# Patient Record
Sex: MALE | Race: WHITE | NOT HISPANIC OR LATINO | Employment: OTHER | URBAN - METROPOLITAN AREA
[De-identification: names, ages, dates, MRNs, and addresses within clinical notes are randomized per-mention and may not be internally consistent; named-entity substitution may affect disease eponyms.]

---

## 2017-05-24 ENCOUNTER — NEW REFERRAL (OUTPATIENT)
Dept: URBAN - METROPOLITAN AREA CLINIC 26 | Facility: CLINIC | Age: 67
End: 2017-05-24

## 2017-05-24 VITALS
HEIGHT: 68 IN | DIASTOLIC BLOOD PRESSURE: 66 MMHG | BODY MASS INDEX: 22.73 KG/M2 | HEART RATE: 71 BPM | SYSTOLIC BLOOD PRESSURE: 103 MMHG | WEIGHT: 150 LBS

## 2017-05-24 DIAGNOSIS — H57.11: ICD-10-CM

## 2017-05-24 DIAGNOSIS — B20: ICD-10-CM

## 2017-05-24 DIAGNOSIS — H02.836: ICD-10-CM

## 2017-05-24 DIAGNOSIS — H02.833: ICD-10-CM

## 2017-05-24 DIAGNOSIS — H04.123: ICD-10-CM

## 2017-05-24 DIAGNOSIS — H44.111: ICD-10-CM

## 2017-05-24 DIAGNOSIS — H20.051: ICD-10-CM

## 2017-05-24 DIAGNOSIS — H43.391: ICD-10-CM

## 2017-05-24 PROCEDURE — 76512 OPH US DX B-SCAN: CPT

## 2017-05-24 PROCEDURE — G8427 DOCREV CUR MEDS BY ELIG CLIN: HCPCS

## 2017-05-24 PROCEDURE — 1036F TOBACCO NON-USER: CPT

## 2017-05-24 PROCEDURE — 92225 OPHTHALMOSCOPY (INITIAL): CPT

## 2017-05-24 PROCEDURE — 92285 EXTERNAL OCULAR PHOTOGRAPHY: CPT

## 2017-05-24 PROCEDURE — 92004 COMPRE OPH EXAM NEW PT 1/>: CPT

## 2017-05-24 PROCEDURE — G8418 CALC BMI BLW LOW PARAM F/U: HCPCS

## 2017-05-24 RX ORDER — PREDNISOLONE ACETATE 10 MG/ML: 1 SUSPENSION/ DROPS OPHTHALMIC

## 2017-05-24 ASSESSMENT — VISUAL ACUITY
OS_CC: 20/25+
OD_CC: CF 3FT

## 2017-05-24 ASSESSMENT — TONOMETRY
OS_IOP_MMHG: 22
OD_IOP_MMHG: 11

## 2017-06-08 ENCOUNTER — ADDENDUM (OUTPATIENT)
Dept: URBAN - METROPOLITAN AREA CLINIC 26 | Facility: CLINIC | Age: 67
End: 2017-06-08

## 2019-04-22 NOTE — PATIENT DISCUSSION
- Follow up in 6 months for [facility] SERVICES [facility]

## 2021-06-14 NOTE — PATIENT DISCUSSION
New Prescription: prednisol ace-gatiflox-bromfen (prednisol ace-gatiflox-bromfen): drops,suspension: 1-0.5-0.075% 1 drop three times a day into affected eye 06-

## 2021-10-06 NOTE — PATIENT DISCUSSION
Continue: prednisol ace-gatiflox-bromfen (prednisol ace-gatiflox-bromfen): drops,suspension: 1-0.5-0.075%.

## 2022-07-30 ENCOUNTER — TELEPHONE ENCOUNTER (OUTPATIENT)
Age: 72
End: 2022-07-30

## 2022-07-31 ENCOUNTER — TELEPHONE ENCOUNTER (OUTPATIENT)
Age: 72
End: 2022-07-31